# Patient Record
Sex: FEMALE | Race: WHITE | NOT HISPANIC OR LATINO | Employment: PART TIME | URBAN - METROPOLITAN AREA
[De-identification: names, ages, dates, MRNs, and addresses within clinical notes are randomized per-mention and may not be internally consistent; named-entity substitution may affect disease eponyms.]

---

## 2017-08-14 ENCOUNTER — PATIENT OUTREACH (OUTPATIENT)
Dept: ADMINISTRATIVE | Facility: HOSPITAL | Age: 37
End: 2017-08-14

## 2017-08-17 ENCOUNTER — PATIENT OUTREACH (OUTPATIENT)
Dept: ADMINISTRATIVE | Facility: HOSPITAL | Age: 37
End: 2017-08-17

## 2017-08-17 NOTE — LETTER
August 17, 2017    Evelia Thornton  [address] [address]             [facility] [address] [place] [address]  Phone: [phone] Dear Evelia Thornton     In the spirit of maintaining your good health, our system indicates that you have not been seen in the office in over 12 months and due for a visit.     Our system indicates that you are due for the following:   Health Maintenance Due   Topic Date Due    Lipid Panel  1982    TETANUS VACCINE  06/08/2000    Pap Smear with HPV Cotest  06/08/2003    Influenza Vaccine  08/01/2017       Krystina Wong MD would like you to schedule an appointment for an annual exam at your earliest convenience. If you have completed any of these health maintenance requirements at an outside facility, please contact our office so we may update your health record.    If your PRIMARY CARE PHYSICIAN has changed please contact your insurance company to reflect the correct physician.    If you have any issues or need assistance in scheduling this appointment, please call [phone]. Thank you for choosing [facility] for all your health care needs.     Marilia DICK LPN Care Coordinator  [facility]  [phone]